# Patient Record
Sex: MALE | Race: ASIAN | Employment: FULL TIME | URBAN - METROPOLITAN AREA
[De-identification: names, ages, dates, MRNs, and addresses within clinical notes are randomized per-mention and may not be internally consistent; named-entity substitution may affect disease eponyms.]

---

## 2017-01-05 ENCOUNTER — OFFICE VISIT (OUTPATIENT)
Dept: FAMILY MEDICINE CLINIC | Facility: CLINIC | Age: 42
End: 2017-01-05

## 2017-01-05 VITALS
BODY MASS INDEX: 24.36 KG/M2 | SYSTOLIC BLOOD PRESSURE: 98 MMHG | TEMPERATURE: 98 F | HEART RATE: 82 BPM | HEIGHT: 71 IN | DIASTOLIC BLOOD PRESSURE: 72 MMHG | OXYGEN SATURATION: 99 % | WEIGHT: 174 LBS

## 2017-01-05 DIAGNOSIS — R63.5 WEIGHT GAIN: ICD-10-CM

## 2017-01-05 DIAGNOSIS — IMO0001 TYPE II OR UNSPECIFIED TYPE DIABETES MELLITUS WITHOUT MENTION OF COMPLICATION, UNCONTROLLED: Primary | ICD-10-CM

## 2017-01-05 LAB
ALBUMIN SERPL-MCNC: 4.3 G/DL (ref 3.5–4.8)
ALP LIVER SERPL-CCNC: 97 U/L (ref 45–117)
ALT SERPL-CCNC: 29 U/L (ref 17–63)
AST SERPL-CCNC: 15 U/L (ref 15–41)
BILIRUB SERPL-MCNC: 0.6 MG/DL (ref 0.1–2)
BILIRUB UR QL STRIP.AUTO: NEGATIVE
BUN BLD-MCNC: 14 MG/DL (ref 8–20)
CALCIUM BLD-MCNC: 9.5 MG/DL (ref 8.3–10.3)
CHLORIDE: 99 MMOL/L (ref 101–111)
CHOLEST SMN-MCNC: 200 MG/DL (ref ?–200)
CLARITY UR REFRACT.AUTO: CLEAR
CO2: 26 MMOL/L (ref 22–32)
COLOR UR AUTO: YELLOW
CREAT BLD-MCNC: 0.97 MG/DL (ref 0.7–1.3)
CREAT UR-SCNC: 60.6 MG/DL
ERYTHROCYTE [DISTWIDTH] IN BLOOD BY AUTOMATED COUNT: 12.8 % (ref 11.5–16)
EST. AVERAGE GLUCOSE BLD GHB EST-MCNC: 232 MG/DL (ref 68–126)
FREE T4: 1.1 NG/DL (ref 0.9–1.8)
GLUCOSE BLD-MCNC: 293 MG/DL (ref 70–99)
GLUCOSE BLOOD: 306
GLUCOSE UR STRIP.AUTO-MCNC: >=500 MG/DL
HBA1C MFR BLD HPLC: 9.7 % (ref ?–5.7)
HCT VFR BLD AUTO: 46.4 % (ref 37–53)
HDLC SERPL-MCNC: 35 MG/DL (ref 45–?)
HDLC SERPL: 5.71 {RATIO} (ref ?–4.97)
HGB BLD-MCNC: 15.3 G/DL (ref 13–17)
LDLC SERPL CALC-MCNC: 98 MG/DL (ref ?–130)
LEUKOCYTE ESTERASE UR QL STRIP.AUTO: NEGATIVE
M PROTEIN MFR SERPL ELPH: 7.9 G/DL (ref 6.1–8.3)
MCH RBC QN AUTO: 27.7 PG (ref 27–33.2)
MCHC RBC AUTO-ENTMCNC: 33 G/DL (ref 31–37)
MCV RBC AUTO: 83.9 FL (ref 80–99)
MICROALBUMIN UR-MCNC: 6.96 MG/DL
MICROALBUMIN/CREAT 24H UR-RTO: 114.9 UG/MG (ref ?–30)
NITRITE UR QL STRIP.AUTO: NEGATIVE
NONHDLC SERPL-MCNC: 165 MG/DL (ref ?–130)
PH UR STRIP.AUTO: 6 [PH] (ref 4.5–8)
PLATELET # BLD AUTO: 237 10(3)UL (ref 150–450)
POTASSIUM SERPL-SCNC: 4 MMOL/L (ref 3.6–5.1)
PROT UR STRIP.AUTO-MCNC: NEGATIVE MG/DL
RBC # BLD AUTO: 5.53 X10(6)UL (ref 4.3–5.7)
RED CELL DISTRIBUTION WIDTH-SD: 38.4 FL (ref 35.1–46.3)
SODIUM SERPL-SCNC: 136 MMOL/L (ref 136–144)
SP GR UR STRIP.AUTO: 1.03 (ref 1–1.03)
TEST STRIP LOT #: NORMAL NUMERIC
TRIGLYCERIDES: 335 MG/DL (ref ?–150)
TSI SER-ACNC: 2.59 MIU/ML (ref 0.35–5.5)
UROBILINOGEN UR STRIP.AUTO-MCNC: <2 MG/DL
VLDL: 67 MG/DL (ref 5–40)
WBC # BLD AUTO: 8.3 X10(3) UL (ref 4–13)

## 2017-01-05 PROCEDURE — 84439 ASSAY OF FREE THYROXINE: CPT | Performed by: FAMILY MEDICINE

## 2017-01-05 PROCEDURE — 82043 UR ALBUMIN QUANTITATIVE: CPT | Performed by: FAMILY MEDICINE

## 2017-01-05 PROCEDURE — 99214 OFFICE O/P EST MOD 30 MIN: CPT | Performed by: FAMILY MEDICINE

## 2017-01-05 PROCEDURE — 83036 HEMOGLOBIN GLYCOSYLATED A1C: CPT | Performed by: FAMILY MEDICINE

## 2017-01-05 PROCEDURE — 80053 COMPREHEN METABOLIC PANEL: CPT | Performed by: FAMILY MEDICINE

## 2017-01-05 PROCEDURE — 85027 COMPLETE CBC AUTOMATED: CPT | Performed by: FAMILY MEDICINE

## 2017-01-05 PROCEDURE — 82570 ASSAY OF URINE CREATININE: CPT | Performed by: FAMILY MEDICINE

## 2017-01-05 PROCEDURE — 82962 GLUCOSE BLOOD TEST: CPT | Performed by: FAMILY MEDICINE

## 2017-01-05 PROCEDURE — 84443 ASSAY THYROID STIM HORMONE: CPT | Performed by: FAMILY MEDICINE

## 2017-01-05 PROCEDURE — 80061 LIPID PANEL: CPT | Performed by: FAMILY MEDICINE

## 2017-01-05 PROCEDURE — 36415 COLL VENOUS BLD VENIPUNCTURE: CPT | Performed by: FAMILY MEDICINE

## 2017-01-05 PROCEDURE — 81001 URINALYSIS AUTO W/SCOPE: CPT | Performed by: FAMILY MEDICINE

## 2017-01-05 PROCEDURE — 81003 URINALYSIS AUTO W/O SCOPE: CPT | Performed by: FAMILY MEDICINE

## 2017-01-05 RX ORDER — LANCETS
1 EACH MISCELLANEOUS 2 TIMES DAILY
Qty: 1 BOX | Refills: 3 | Status: SHIPPED | OUTPATIENT
Start: 2017-01-05 | End: 2018-01-05

## 2017-01-05 RX ORDER — GLIMEPIRIDE 4 MG/1
4 TABLET ORAL 2 TIMES DAILY
Qty: 60 TABLET | Refills: 3 | Status: SHIPPED | OUTPATIENT
Start: 2017-01-05 | End: 2017-06-17

## 2017-01-05 RX ORDER — BLOOD-GLUCOSE METER
1 EACH MISCELLANEOUS 2 TIMES DAILY
Qty: 1 KIT | Refills: 0 | Status: SHIPPED | OUTPATIENT
Start: 2017-01-05 | End: 2018-01-05

## 2017-01-05 RX ORDER — LISINOPRIL 2.5 MG/1
2.5 TABLET ORAL DAILY
Qty: 30 TABLET | Refills: 3 | Status: SHIPPED | OUTPATIENT
Start: 2017-01-05 | End: 2017-07-21

## 2017-01-05 RX ORDER — GLIMEPIRIDE 2 MG/1
2 TABLET ORAL 2 TIMES DAILY
COMMUNITY
End: 2017-01-05

## 2017-01-05 RX ORDER — ATORVASTATIN CALCIUM 10 MG/1
10 TABLET, FILM COATED ORAL DAILY
COMMUNITY
End: 2017-12-14

## 2017-01-05 NOTE — PROGRESS NOTES
BP 98/72 mmHg  Pulse 82  Temp(Src) 98 °F (36.7 °C) (Oral)  Ht 71\"  Wt 174 lb  BMI 24.28 kg/m2  SpO2 99% Body mass index is 24.28 kg/(m^2).      Patient presents with:  Diabetes: room 3 refill on meds       HPI:   Yari Guardado is a 42 year ol 8.1(H)   Cholesterol Total <200 mg/dL - 148   Triglycerides <150 mg/dL - 171(H)   HDL >45 mg/dL - 38(L)   LDL <130 mg/dL - 76   Microalbumin Urine - - 12.80   Microalb/Creatinine Calc <=30.0 ug/mg - 32. 8(H)   LISINOPRIL 2.5 MG OR TABS - Take 1 tablet (2.5 without murmur, normal S1 and S2 without S3 or S4  GI: good BS's,no masses, HSM or tenderness  EXTREMITIES: no cyanosis, clubbing or edema  NEURO: A&O to person place and time. Normal bilateral monofilament exam. Normal bilateral vibration sensation.  Norm tablet (2.5 mg total) by mouth daily. aspirin 81 MG Oral Tab 30 tablet 0      Sig: Take 1 tablet (81 mg total) by mouth daily.       Blood Glucose Monitoring Suppl (ACCU-CHEK LUZ MARIA PLUS) W/DEVICE Does not apply Kit 1 kit 0      Si Device by Other r

## 2017-01-06 NOTE — PROGRESS NOTES
Quick Note:    Abnormal results, please advised the patient to follow-up next week to discuss the options to treat  ______

## 2017-01-09 ENCOUNTER — TELEPHONE (OUTPATIENT)
Dept: FAMILY MEDICINE CLINIC | Facility: CLINIC | Age: 42
End: 2017-01-09

## 2017-01-09 NOTE — TELEPHONE ENCOUNTER
Spoke with patient regarding need for follow up visit to discuss test results, he verbalized understanding and will follow up on 01/12/2017 to discuss with

## 2017-01-09 NOTE — TELEPHONE ENCOUNTER
----- Message from Edmund Cunha sent at 1/6/2017  3:30 PM CST -----  LVM to have patient call office back.

## 2017-01-12 ENCOUNTER — OFFICE VISIT (OUTPATIENT)
Dept: FAMILY MEDICINE CLINIC | Facility: CLINIC | Age: 42
End: 2017-01-12

## 2017-01-12 VITALS
BODY MASS INDEX: 24.36 KG/M2 | HEIGHT: 71 IN | HEART RATE: 88 BPM | WEIGHT: 174 LBS | TEMPERATURE: 98 F | SYSTOLIC BLOOD PRESSURE: 130 MMHG | DIASTOLIC BLOOD PRESSURE: 72 MMHG | OXYGEN SATURATION: 99 %

## 2017-01-12 DIAGNOSIS — IMO0001 TYPE II OR UNSPECIFIED TYPE DIABETES MELLITUS WITHOUT MENTION OF COMPLICATION, UNCONTROLLED: ICD-10-CM

## 2017-01-12 LAB
GLUCOSE BLOOD: 301
TEST STRIP LOT #: NORMAL NUMERIC

## 2017-01-12 PROCEDURE — 99214 OFFICE O/P EST MOD 30 MIN: CPT | Performed by: FAMILY MEDICINE

## 2017-01-12 PROCEDURE — 82962 GLUCOSE BLOOD TEST: CPT | Performed by: FAMILY MEDICINE

## 2017-01-12 RX ORDER — METFORMIN HYDROCHLORIDE 750 MG/1
750 TABLET, EXTENDED RELEASE ORAL
Qty: 30 TABLET | Refills: 3 | Status: SHIPPED | OUTPATIENT
Start: 2017-01-12 | End: 2017-02-18

## 2017-01-13 NOTE — PROGRESS NOTES
/72 mmHg  Pulse 88  Temp(Src) 97.9 °F (36.6 °C) (Oral)  Ht 71\"  Wt 174 lb  BMI 24.28 kg/m2  SpO2 99%              No chief complaint on file. HPI;    Min Granado is a 42year old male.   Patient comes in today for follow-up, after Finger stick route daily.  Disp: 1 Box Rfl: 2   Glucose Blood (ONETOUCH TEST) In Vitro Strip daily Disp: 50 each Rfl: 2      Past Medical History   Diagnosis Date   • Diabetes ([facility] [address]    • Hyperlipidemia       Social History:    Smoking Status: Never Smoker time was used in counseling regarding diet and exercise  The patient indicates understanding of these issues and agrees to the plan.   Imaging & Consults:  None  Meds & Refills for this Visit:  Signed Prescriptions Disp Refills    MetFORMIN HCl  MG Or

## 2017-02-18 ENCOUNTER — OFFICE VISIT (OUTPATIENT)
Dept: FAMILY MEDICINE CLINIC | Facility: CLINIC | Age: 42
End: 2017-02-18

## 2017-02-18 VITALS
DIASTOLIC BLOOD PRESSURE: 74 MMHG | RESPIRATION RATE: 18 BRPM | TEMPERATURE: 98 F | BODY MASS INDEX: 23.94 KG/M2 | HEIGHT: 71 IN | OXYGEN SATURATION: 100 % | WEIGHT: 171 LBS | SYSTOLIC BLOOD PRESSURE: 118 MMHG | HEART RATE: 99 BPM

## 2017-02-18 DIAGNOSIS — IMO0001 UNCONTROLLED TYPE 2 DIABETES MELLITUS WITHOUT COMPLICATION, WITHOUT LONG-TERM CURRENT USE OF INSULIN: ICD-10-CM

## 2017-02-18 LAB — GLUCOSE BLOOD: 220

## 2017-02-18 PROCEDURE — 82962 GLUCOSE BLOOD TEST: CPT | Performed by: FAMILY MEDICINE

## 2017-02-18 PROCEDURE — 99214 OFFICE O/P EST MOD 30 MIN: CPT | Performed by: FAMILY MEDICINE

## 2017-02-18 RX ORDER — METFORMIN HYDROCHLORIDE 750 MG/1
750 TABLET, EXTENDED RELEASE ORAL 2 TIMES DAILY WITH MEALS
Qty: 60 TABLET | Refills: 3 | Status: SHIPPED | OUTPATIENT
Start: 2017-02-18 | End: 2017-07-21

## 2017-02-19 NOTE — PROGRESS NOTES
/74 mmHg  Pulse 99  Temp(Src) 98.4 °F (36.9 °C) (Oral)  Resp 18  Ht 71\"  Wt 171 lb  BMI 23.86 kg/m2  SpO2 100%              Patient presents with:  Diabetes: room 2--needs referral for eye exam, glucose was 220 in office       HPI;    Tosha Argueta Take 1 tablet (750 mg total) by mouth daily with breakfast. Disp: 30 tablet Rfl: 3   glimepiride (AMARYL) 4 MG Oral Tab Take 1 tablet (4 mg total) by mouth 2 (two) times daily.  Disp: 60 tablet Rfl: 3   Blood Glucose Monitoring Suppl (ONETOUCH ULTRA 2) W/DE check  sugars at least twice a day  Referral given for the patient to see the ophthalmologist for diabetic eye exam  -       Accucheck  -     MetFORMIN HCl  MG Oral Tablet 24 Hr; Take 1 tablet (750 mg total) by mouth 2 (two) times daily with meals.

## 2017-04-06 ENCOUNTER — OFFICE VISIT (OUTPATIENT)
Dept: FAMILY MEDICINE CLINIC | Facility: CLINIC | Age: 42
End: 2017-04-06

## 2017-04-06 VITALS
BODY MASS INDEX: 24.5 KG/M2 | HEIGHT: 71 IN | SYSTOLIC BLOOD PRESSURE: 120 MMHG | OXYGEN SATURATION: 99 % | DIASTOLIC BLOOD PRESSURE: 80 MMHG | WEIGHT: 175 LBS | TEMPERATURE: 99 F | HEART RATE: 102 BPM

## 2017-04-06 DIAGNOSIS — IMO0001 UNCONTROLLED TYPE 2 DIABETES MELLITUS WITHOUT COMPLICATION, WITHOUT LONG-TERM CURRENT USE OF INSULIN: Primary | ICD-10-CM

## 2017-04-06 PROCEDURE — 99214 OFFICE O/P EST MOD 30 MIN: CPT | Performed by: FAMILY MEDICINE

## 2017-04-06 PROCEDURE — 36415 COLL VENOUS BLD VENIPUNCTURE: CPT | Performed by: FAMILY MEDICINE

## 2017-04-06 NOTE — PROGRESS NOTES
/80 mmHg  Pulse 102  Temp(Src) 98.9 °F (37.2 °C) (Oral)  Ht 71\"  Wt 175 lb  BMI 24.42 kg/m2  SpO2 99%              Patient presents with:  Diabetes: f/u       HPI;    Kari Mallory is a 42year old male.   Patient is here for follow-up on Suppl (ONETOUCH ULTRA 2) W/DEVICE Does not apply Kit 1 Device by Other route 2 (two) times daily. Use as directed. Disp: 1 kit Rfl: 0   ONETOUCH DELICA LANCETS FINE Does not apply Misc 1 lancet by Finger stick route daily.  Disp: 1 Box Rfl: 2   Glucose Bloo visit  - should continue to check  sugars at least twice a day  Referral given in February for seeing ophthalmology, patient did not see the ophthalmologist yet for the annual eye exam    Comp Metabolic Panel;  Future  -     Hemoglobin A1C; Future  Total ti

## 2017-04-07 NOTE — PROGRESS NOTES
Quick Note:    Please notify the patient of Improved hemoglobin A1  continue current management--No change in medications  Continue to diet and exercise  Recheck blood work in 3 months    ______

## 2017-04-08 ENCOUNTER — TELEPHONE (OUTPATIENT)
Dept: FAMILY MEDICINE CLINIC | Facility: CLINIC | Age: 42
End: 2017-04-08

## 2017-04-09 ENCOUNTER — TELEPHONE (OUTPATIENT)
Dept: FAMILY MEDICINE CLINIC | Facility: CLINIC | Age: 42
End: 2017-04-09

## 2017-04-10 NOTE — TELEPHONE ENCOUNTER
Patient informed that he needs to schedule opthalmology appointment, he verbalized understanding and stated he will.

## 2017-04-26 RX ORDER — ATORVASTATIN CALCIUM 10 MG/1
TABLET, FILM COATED ORAL
Qty: 30 TABLET | Refills: 1 | OUTPATIENT
Start: 2017-04-26

## 2017-06-17 DIAGNOSIS — IMO0001 UNCONTROLLED TYPE 2 DIABETES MELLITUS WITHOUT COMPLICATION, WITHOUT LONG-TERM CURRENT USE OF INSULIN: Primary | ICD-10-CM

## 2017-06-19 RX ORDER — GLIMEPIRIDE 4 MG/1
TABLET ORAL
Qty: 60 TABLET | Refills: 2 | Status: SHIPPED | OUTPATIENT
Start: 2017-06-19 | End: 2017-12-14

## 2017-06-19 NOTE — TELEPHONE ENCOUNTER
Pending Prescriptions Disp Refills    GLIMEPIRIDE 4 MG Oral Tab [Pharmacy Med Name: Glimepiride Oral Tablet 4 MG] 60 tablet 2     Sig: TAKE 1 TABLET BY MOUTH TWO TIMES A DAY        Lov4/6/2017, No future appointments.   Diabetes:    Lab Results  Component

## 2017-07-21 DIAGNOSIS — IMO0001 UNCONTROLLED TYPE 2 DIABETES MELLITUS WITHOUT COMPLICATION, WITHOUT LONG-TERM CURRENT USE OF INSULIN: ICD-10-CM

## 2017-07-21 DIAGNOSIS — I10 ESSENTIAL HYPERTENSION: Primary | ICD-10-CM

## 2017-07-21 RX ORDER — LISINOPRIL 2.5 MG/1
TABLET ORAL
Qty: 30 TABLET | Refills: 2 | Status: SHIPPED | OUTPATIENT
Start: 2017-07-21 | End: 2017-12-14

## 2017-07-21 RX ORDER — METFORMIN HYDROCHLORIDE 750 MG/1
TABLET, EXTENDED RELEASE ORAL
Qty: 60 TABLET | Refills: 2 | Status: SHIPPED | OUTPATIENT
Start: 2017-07-21 | End: 2017-11-10

## 2017-07-21 NOTE — TELEPHONE ENCOUNTER
Refill as per protocol. LOV 4/6/2017    No future appointments.        Signed Prescriptions Disp Refills    LISINOPRIL 2.5 MG Oral Tab 30 tablet 2      Sig: TAKE 1 TABLET BY MOUTH ONE TIME A DAY         Authorizing Provider: Rosa Barfield

## 2017-10-28 DIAGNOSIS — IMO0001 UNCONTROLLED TYPE 2 DIABETES MELLITUS WITHOUT COMPLICATION, WITHOUT LONG-TERM CURRENT USE OF INSULIN: ICD-10-CM

## 2017-10-30 RX ORDER — METFORMIN HYDROCHLORIDE 750 MG/1
TABLET, EXTENDED RELEASE ORAL
Qty: 60 TABLET | Refills: 1 | OUTPATIENT
Start: 2017-10-30

## 2017-10-30 NOTE — TELEPHONE ENCOUNTER
Refused Prescriptions Disp Refills    METFORMIN HCL  MG Oral Tablet 24 Hr [Pharmacy Med Name: MetFORMIN HCl ER Oral Tablet Extended Release 24 Hour 750 MG] 60 tablet 1      Sig: TAKE 1 TABLET BY MOUTH TWO TIMES A DAY WITH MEALS        Refused By: Adams Yanes

## 2017-11-10 DIAGNOSIS — IMO0001 UNCONTROLLED TYPE 2 DIABETES MELLITUS WITHOUT COMPLICATION, WITHOUT LONG-TERM CURRENT USE OF INSULIN: ICD-10-CM

## 2017-11-10 RX ORDER — METFORMIN HYDROCHLORIDE 750 MG/1
TABLET, EXTENDED RELEASE ORAL
Qty: 60 TABLET | Refills: 0 | Status: SHIPPED | OUTPATIENT
Start: 2017-11-10 | End: 2017-12-14

## 2017-11-10 NOTE — TELEPHONE ENCOUNTER
Signed Prescriptions Disp Refills    METFORMIN HCL  MG Oral Tablet 24 Hr 60 tablet 0      Sig: TAKE 1 TABLET BY MOUTH TWO TIMES A DAY WITH MEALS        Authorizing Provider: Leisa Bullard        Ordering User: Darin isbell 04/06/2017,

## 2017-12-06 DIAGNOSIS — IMO0001 UNCONTROLLED TYPE 2 DIABETES MELLITUS WITHOUT COMPLICATION, WITHOUT LONG-TERM CURRENT USE OF INSULIN: ICD-10-CM

## 2017-12-06 RX ORDER — METFORMIN HYDROCHLORIDE 750 MG/1
TABLET, EXTENDED RELEASE ORAL
Qty: 60 TABLET | Refills: 0 | OUTPATIENT
Start: 2017-12-06

## 2017-12-06 NOTE — TELEPHONE ENCOUNTER
Pending Prescriptions Disp Refills    METFORMIN HCL  MG Oral Tablet 24 Hr [Pharmacy Med Name: MetFORMIN HCl ER Oral Tablet Extended Release 24 Hour 750 MG] 60 tablet 0     Sig: TAKE 1 TABLET BY MOUTH TWO TIMES A DAY WITH MEALS       refill denied a

## 2017-12-14 ENCOUNTER — LAB ENCOUNTER (OUTPATIENT)
Dept: LAB | Age: 42
End: 2017-12-14
Attending: FAMILY MEDICINE
Payer: COMMERCIAL

## 2017-12-14 ENCOUNTER — OFFICE VISIT (OUTPATIENT)
Dept: FAMILY MEDICINE CLINIC | Facility: CLINIC | Age: 42
End: 2017-12-14

## 2017-12-14 VITALS
DIASTOLIC BLOOD PRESSURE: 78 MMHG | HEART RATE: 68 BPM | RESPIRATION RATE: 18 BRPM | OXYGEN SATURATION: 98 % | SYSTOLIC BLOOD PRESSURE: 124 MMHG | HEIGHT: 71 IN | WEIGHT: 175.38 LBS | BODY MASS INDEX: 24.55 KG/M2 | TEMPERATURE: 98 F

## 2017-12-14 DIAGNOSIS — Z00.00 ANNUAL PHYSICAL EXAM: Primary | ICD-10-CM

## 2017-12-14 DIAGNOSIS — IMO0001 UNCONTROLLED TYPE 2 DIABETES MELLITUS WITHOUT COMPLICATION, WITHOUT LONG-TERM CURRENT USE OF INSULIN: ICD-10-CM

## 2017-12-14 DIAGNOSIS — Z23 NEED FOR VACCINATION: ICD-10-CM

## 2017-12-14 DIAGNOSIS — I10 ESSENTIAL HYPERTENSION: ICD-10-CM

## 2017-12-14 PROCEDURE — 80061 LIPID PANEL: CPT

## 2017-12-14 PROCEDURE — 99396 PREV VISIT EST AGE 40-64: CPT | Performed by: FAMILY MEDICINE

## 2017-12-14 PROCEDURE — 36415 COLL VENOUS BLD VENIPUNCTURE: CPT

## 2017-12-14 PROCEDURE — 83036 HEMOGLOBIN GLYCOSYLATED A1C: CPT

## 2017-12-14 PROCEDURE — 90686 IIV4 VACC NO PRSV 0.5 ML IM: CPT | Performed by: FAMILY MEDICINE

## 2017-12-14 PROCEDURE — 99214 OFFICE O/P EST MOD 30 MIN: CPT | Performed by: FAMILY MEDICINE

## 2017-12-14 PROCEDURE — 90471 IMMUNIZATION ADMIN: CPT | Performed by: FAMILY MEDICINE

## 2017-12-14 RX ORDER — ATORVASTATIN CALCIUM 10 MG/1
10 TABLET, FILM COATED ORAL DAILY
Qty: 30 TABLET | Refills: 3 | Status: SHIPPED | OUTPATIENT
Start: 2017-12-14 | End: 2018-04-09

## 2017-12-14 RX ORDER — LISINOPRIL 2.5 MG/1
TABLET ORAL
Qty: 30 TABLET | Refills: 3 | Status: SHIPPED | OUTPATIENT
Start: 2017-12-14

## 2017-12-14 RX ORDER — GLIMEPIRIDE 4 MG/1
TABLET ORAL
Qty: 60 TABLET | Refills: 3 | Status: SHIPPED | OUTPATIENT
Start: 2017-12-14 | End: 2018-06-13

## 2017-12-14 RX ORDER — METFORMIN HYDROCHLORIDE 750 MG/1
TABLET, EXTENDED RELEASE ORAL
Qty: 60 TABLET | Refills: 3 | Status: SHIPPED | OUTPATIENT
Start: 2017-12-14 | End: 2018-05-09

## 2017-12-14 NOTE — PROGRESS NOTES
Worsening of hemoglobin A1c  8.4  Improved lipid profile  Patient to follow-up to discuss the options of insulin

## 2017-12-14 NOTE — PROGRESS NOTES
/78   Pulse 68   Temp 97.6 °F (36.4 °C) (Temporal)   Resp 18   Ht 71\"   Wt 175 lb 6.4 oz   SpO2 98%   BMI 24.46 kg/m²  Body mass index is 24.46 kg/m². Patient presents with:   Annual  Diabetes: follow up- foot exam - refill on medication      N Blood Glucose Monitoring Suppl (ACCU-CHEK LUZ MARIA PLUS) W/DEVICE Does not apply Kit 1 Device by Other route 2 (two) times daily.  Disp: 1 kit Rfl: 0   Glucose Blood (ACCU-CHEK LUZ MARIA PLUS) In Vitro Strip Check sugars twice a day Disp: 100 strip Rfl: 1   ACCU depression or anxiety  HEMATOLOGY: denies h/o anemia; denies bruising or excessive bleeding  ENDOCRINE: denies excessive thirst or urination; denies unexpected wt gain or wt loss  ALLERGY/IMM.: denies food or seasonal allergies    EXAM:   /78   Pulse blood sugar regulation. Discussed effort for compliance with program, including any medication and Exercise   The need for regular foot care and ophthalmologic evaluation was discussed. Follow fasting blood glucose levels.  Advice to bring the log of afua

## 2018-04-09 RX ORDER — ATORVASTATIN CALCIUM 10 MG/1
TABLET, FILM COATED ORAL
Qty: 30 TABLET | Refills: 2 | Status: SHIPPED | OUTPATIENT
Start: 2018-04-09

## 2018-04-09 NOTE — TELEPHONE ENCOUNTER
Cholesterol Medication Protocol Failed4/9 3:31 AM   ALT < 80    ALT resulted within past year     Approve/deny refill.  Last filled 12/14/17, last ov 12/14/17, last labwork 12/14/17,  Pt has appt scheduled 4/14/18

## 2018-04-13 ENCOUNTER — TELEPHONE (OUTPATIENT)
Dept: FAMILY MEDICINE CLINIC | Facility: CLINIC | Age: 43
End: 2018-04-13

## 2018-04-13 DIAGNOSIS — E78.00 PURE HYPERCHOLESTEROLEMIA: ICD-10-CM

## 2018-04-13 DIAGNOSIS — IMO0001 UNCONTROLLED TYPE 2 DIABETES MELLITUS WITHOUT COMPLICATION, WITHOUT LONG-TERM CURRENT USE OF INSULIN: Primary | ICD-10-CM

## 2018-04-13 NOTE — TELEPHONE ENCOUNTER
Please see below, pt last visit and labs done on 12/14/17 and was to follow up in 3 months. Do you want more labs done prior to his visit in May?

## 2018-05-09 DIAGNOSIS — IMO0001 UNCONTROLLED TYPE 2 DIABETES MELLITUS WITHOUT COMPLICATION, WITHOUT LONG-TERM CURRENT USE OF INSULIN: ICD-10-CM

## 2018-05-10 RX ORDER — METFORMIN HYDROCHLORIDE 750 MG/1
TABLET, EXTENDED RELEASE ORAL
Qty: 60 TABLET | Refills: 0 | Status: SHIPPED | OUTPATIENT
Start: 2018-05-10

## 2018-05-10 NOTE — TELEPHONE ENCOUNTER
12/14/2017  Future Appointments  Date Time Provider Consuelo Lopez   5/24/2018 11:00 AM Maida Rojas MD [facility]       Telephone Information:   Home Phone [phone]   Mobile [phone]     Diabetes:    Lab Results  Component Value Date   A1C 8.4 (H) 12/14/2017   A1C 7.2 (H) 04/06/2017   A1C 9.7 (H) 01/05/2017    (H) 12/14/2017    (H) 04/06/2017    (H) 01/05/2017     Lab Results  Component Value Date   CHOLEST 172 12/14/2017   CHOLEST 200 (H) 01/05/2017   CHOLEST 148 05/06/2016     Lab Results  Component Value Date   HDL 35 (L) 12/14/2017   HDL 35 (L) 01/05/2017   HDL 38 (L) 05/06/2016     Lab Results  Component Value Date    12/14/2017   LDL 98 01/05/2017   LDL 76 05/06/2016     Lab Results  Component Value Date   TRIG 174 (H) 12/14/2017   TRIG 335 (H) 01/05/2017   TRIG 171 (H) 05/06/2016     Lab Results  Component Value Date   AST 21 04/06/2017   AST 15 01/05/2017   AST 13 (L) 05/06/2016     Lab Results  Component Value Date   ALT 51 04/06/2017   ALT 29 01/05/2017   ALT 25 05/06/2016

## 2018-05-13 DIAGNOSIS — IMO0001 UNCONTROLLED TYPE 2 DIABETES MELLITUS WITHOUT COMPLICATION, WITHOUT LONG-TERM CURRENT USE OF INSULIN: ICD-10-CM

## 2018-05-14 RX ORDER — METFORMIN HYDROCHLORIDE 750 MG/1
TABLET, EXTENDED RELEASE ORAL
Qty: 60 TABLET | Refills: 0 | Status: SHIPPED | OUTPATIENT
Start: 2018-05-14 | End: 2018-06-17

## 2018-05-14 NOTE — TELEPHONE ENCOUNTER
Refill for Metformin approved per protocol. LOV 12/14/17, and labs 12/14/17. Next appt in office 05/24/2018.

## 2018-06-13 DIAGNOSIS — IMO0001 UNCONTROLLED TYPE 2 DIABETES MELLITUS WITHOUT COMPLICATION, WITHOUT LONG-TERM CURRENT USE OF INSULIN: ICD-10-CM

## 2018-06-14 RX ORDER — GLIMEPIRIDE 4 MG/1
TABLET ORAL
Qty: 30 TABLET | Refills: 0 | Status: SHIPPED | OUTPATIENT
Start: 2018-06-14

## 2018-06-14 NOTE — TELEPHONE ENCOUNTER
LOV    LAST LAB 12/17  Notes Recorded by Hanane Fernandes RN on 12/29/2017 at 12:10 PM CST  Patient stated he spoke with Dr and regarding these results and will follow up in three months    LAST RX   glimepiride 4 MG Oral Tab 60 tablet 3 12/14/2017

## 2018-06-14 NOTE — TELEPHONE ENCOUNTER
Left detailed message with pt regarding 4 cancelled appts and most recent NO show    Pt's labs were due 4/14/18 and pt has also not completed these    Asked pt to please have labs done ASAP then call for appt    Refilled #30 only

## 2018-06-17 DIAGNOSIS — IMO0001 UNCONTROLLED TYPE 2 DIABETES MELLITUS WITHOUT COMPLICATION, WITHOUT LONG-TERM CURRENT USE OF INSULIN: ICD-10-CM

## 2018-06-18 RX ORDER — GLIMEPIRIDE 4 MG/1
TABLET ORAL
Qty: 60 TABLET | Refills: 2 | OUTPATIENT
Start: 2018-06-18

## 2018-06-18 RX ORDER — METFORMIN HYDROCHLORIDE 750 MG/1
TABLET, EXTENDED RELEASE ORAL
Qty: 30 TABLET | Refills: 0 | Status: SHIPPED | OUTPATIENT
Start: 2018-06-18

## 2018-06-18 NOTE — TELEPHONE ENCOUNTER
No future appointments.           10:51 AM   Note      Left detailed message with pt regarding 4 cancelled appts and most recent NO show     Pt's labs were due 4/14/18 and pt has also not completed these     Asked pt to please have labs done ASAP then call

## 2018-07-19 DIAGNOSIS — IMO0001 UNCONTROLLED TYPE 2 DIABETES MELLITUS WITHOUT COMPLICATION, WITHOUT LONG-TERM CURRENT USE OF INSULIN: ICD-10-CM

## 2018-07-20 RX ORDER — GLIMEPIRIDE 4 MG/1
TABLET ORAL
Qty: 30 TABLET | Refills: 0 | OUTPATIENT
Start: 2018-07-20

## 2018-07-20 NOTE — TELEPHONE ENCOUNTER
LOV    LAST LAB    LAST RX    Next OV    PROTOCOL    Diabetic Medication Protocol Failed7/19 3:46 PM   HgBA1C procedure resulted in past 6 months    Last HgBA1C < 7.5    Microalbumin procedure in past 12 months or taking ACE/ARB    Appointment in past 6 or

## 2018-12-11 ENCOUNTER — PATIENT OUTREACH (OUTPATIENT)
Dept: FAMILY MEDICINE CLINIC | Facility: CLINIC | Age: 43
End: 2018-12-11

## 2018-12-11 NOTE — PROGRESS NOTES
Left message for patient to schedule an appointment with Dr. Elis Saez.     Vita Howard, 12/11/18, 4:38 PM

## 2019-02-15 ENCOUNTER — APPOINTMENT (OUTPATIENT)
Dept: CV DIAGNOSTICS | Age: 44
End: 2019-02-15
Attending: EMERGENCY MEDICINE
Payer: COMMERCIAL

## 2019-02-15 ENCOUNTER — TELEPHONE (OUTPATIENT)
Dept: FAMILY MEDICINE CLINIC | Facility: CLINIC | Age: 44
End: 2019-02-15

## 2019-02-15 ENCOUNTER — HOSPITAL ENCOUNTER (EMERGENCY)
Age: 44
Discharge: HOME | End: 2019-02-15
Attending: EMERGENCY MEDICINE
Payer: COMMERCIAL

## 2019-02-15 ENCOUNTER — APPOINTMENT (OUTPATIENT)
Dept: GENERAL RADIOLOGY | Age: 44
End: 2019-02-15
Attending: EMERGENCY MEDICINE
Payer: COMMERCIAL

## 2019-02-15 VITALS
TEMPERATURE: 98 F | BODY MASS INDEX: 23.52 KG/M2 | OXYGEN SATURATION: 98 % | DIASTOLIC BLOOD PRESSURE: 72 MMHG | HEIGHT: 71 IN | WEIGHT: 168 LBS | RESPIRATION RATE: 18 BRPM | HEART RATE: 74 BPM | SYSTOLIC BLOOD PRESSURE: 102 MMHG

## 2019-02-15 DIAGNOSIS — R00.2 PALPITATIONS: Primary | ICD-10-CM

## 2019-02-15 LAB
ALBUMIN SERPL-MCNC: 3.9 G/DL (ref 3.4–5)
ALBUMIN/GLOB SERPL: 1 {RATIO} (ref 1–2)
ALP LIVER SERPL-CCNC: 81 U/L (ref 45–117)
ALT SERPL-CCNC: 28 U/L (ref 16–61)
ANION GAP SERPL CALC-SCNC: 8 MMOL/L (ref 0–18)
AST SERPL-CCNC: 11 U/L (ref 15–37)
ATRIAL RATE: 79 BPM
BASOPHILS # BLD AUTO: 0.03 X10(3) UL (ref 0–0.2)
BASOPHILS NFR BLD AUTO: 0.4 %
BILIRUB SERPL-MCNC: 1 MG/DL (ref 0.1–2)
BUN BLD-MCNC: 12 MG/DL (ref 7–18)
BUN/CREAT SERPL: 12.2 (ref 10–20)
CALCIUM BLD-MCNC: 8.7 MG/DL (ref 8.5–10.1)
CHLORIDE SERPL-SCNC: 100 MMOL/L (ref 98–107)
CO2 SERPL-SCNC: 26 MMOL/L (ref 21–32)
CREAT BLD-MCNC: 0.98 MG/DL (ref 0.7–1.3)
D-DIMER: <0.27 UG/ML FEU (ref 0–0.49)
DEPRECATED RDW RBC AUTO: 37.4 FL (ref 35.1–46.3)
EOSINOPHIL # BLD AUTO: 0.2 X10(3) UL (ref 0–0.7)
EOSINOPHIL NFR BLD AUTO: 2.4 %
ERYTHROCYTE [DISTWIDTH] IN BLOOD BY AUTOMATED COUNT: 12.7 % (ref 11–15)
GLOBULIN PLAS-MCNC: 4.1 G/DL (ref 2.8–4.4)
GLUCOSE BLD-MCNC: 271 MG/DL (ref 70–99)
HCT VFR BLD AUTO: 49.2 % (ref 39–53)
HGB BLD-MCNC: 16 G/DL (ref 13–17.5)
IMM GRANULOCYTES # BLD AUTO: 0.02 X10(3) UL (ref 0–1)
IMM GRANULOCYTES NFR BLD: 0.2 %
LIPASE SERPL-CCNC: 220 U/L (ref 73–393)
LYMPHOCYTES # BLD AUTO: 2.02 X10(3) UL (ref 1–4)
LYMPHOCYTES NFR BLD AUTO: 24.7 %
M PROTEIN MFR SERPL ELPH: 8 G/DL (ref 6.4–8.2)
MCH RBC QN AUTO: 26.5 PG (ref 26–34)
MCHC RBC AUTO-ENTMCNC: 32.5 G/DL (ref 31–37)
MCV RBC AUTO: 81.5 FL (ref 80–100)
MONOCYTES # BLD AUTO: 0.62 X10(3) UL (ref 0.1–1)
MONOCYTES NFR BLD AUTO: 7.6 %
NEUTROPHILS # BLD AUTO: 5.29 X10 (3) UL (ref 1.5–7.7)
NEUTROPHILS # BLD AUTO: 5.29 X10(3) UL (ref 1.5–7.7)
NEUTROPHILS NFR BLD AUTO: 64.7 %
OSMOLALITY SERPL CALC.SUM OF ELEC: 287 MOSM/KG (ref 275–295)
P AXIS: 66 DEGREES
P-R INTERVAL: 180 MS
PLATELET # BLD AUTO: 283 10(3)UL (ref 150–450)
POTASSIUM SERPL-SCNC: 3.9 MMOL/L (ref 3.5–5.1)
Q-T INTERVAL: 370 MS
QRS DURATION: 84 MS
QTC CALCULATION (BEZET): 424 MS
R AXIS: 35 DEGREES
RBC # BLD AUTO: 6.04 X10(6)UL (ref 4.3–5.7)
SODIUM SERPL-SCNC: 134 MMOL/L (ref 136–145)
T AXIS: 46 DEGREES
TROPONIN I SERPL-MCNC: <0.045 NG/ML (ref ?–0.04)
VENTRICULAR RATE: 79 BPM
WBC # BLD AUTO: 8.2 X10(3) UL (ref 4–11)

## 2019-02-15 PROCEDURE — 85378 FIBRIN DEGRADE SEMIQUANT: CPT | Performed by: EMERGENCY MEDICINE

## 2019-02-15 PROCEDURE — 96361 HYDRATE IV INFUSION ADD-ON: CPT

## 2019-02-15 PROCEDURE — 93005 ELECTROCARDIOGRAM TRACING: CPT

## 2019-02-15 PROCEDURE — 96360 HYDRATION IV INFUSION INIT: CPT

## 2019-02-15 PROCEDURE — 93018 CV STRESS TEST I&R ONLY: CPT | Performed by: EMERGENCY MEDICINE

## 2019-02-15 PROCEDURE — 85025 COMPLETE CBC W/AUTO DIFF WBC: CPT | Performed by: EMERGENCY MEDICINE

## 2019-02-15 PROCEDURE — 83690 ASSAY OF LIPASE: CPT | Performed by: EMERGENCY MEDICINE

## 2019-02-15 PROCEDURE — 99285 EMERGENCY DEPT VISIT HI MDM: CPT

## 2019-02-15 PROCEDURE — 71045 X-RAY EXAM CHEST 1 VIEW: CPT | Performed by: EMERGENCY MEDICINE

## 2019-02-15 PROCEDURE — 93350 STRESS TTE ONLY: CPT | Performed by: EMERGENCY MEDICINE

## 2019-02-15 PROCEDURE — 84484 ASSAY OF TROPONIN QUANT: CPT | Performed by: EMERGENCY MEDICINE

## 2019-02-15 PROCEDURE — 93010 ELECTROCARDIOGRAM REPORT: CPT

## 2019-02-15 PROCEDURE — 93017 CV STRESS TEST TRACING ONLY: CPT | Performed by: EMERGENCY MEDICINE

## 2019-02-15 PROCEDURE — 80053 COMPREHEN METABOLIC PANEL: CPT | Performed by: EMERGENCY MEDICINE

## 2019-02-15 RX ORDER — ASPIRIN 81 MG/1
324 TABLET, CHEWABLE ORAL ONCE
Status: COMPLETED | OUTPATIENT
Start: 2019-02-15 | End: 2019-02-15

## 2019-02-15 RX ORDER — SODIUM CHLORIDE 9 MG/ML
INJECTION, SOLUTION INTRAVENOUS CONTINUOUS
Status: DISCONTINUED | OUTPATIENT
Start: 2019-02-15 | End: 2019-02-15

## 2019-02-15 NOTE — TELEPHONE ENCOUNTER
Patient called in requesting an appointment with Dr. Kala Ribeiro today.  has experienced some chest discomfort since yesterday. Denies any SOB. States also feels like his heart is beating fast.  Speech clear and calm does not sound anxious.   Advised

## 2019-02-15 NOTE — ED PROVIDER NOTES
Stress echo results as reviewed with Dr. Luan Warner are negative patient was discharged as per disposition

## 2019-02-15 NOTE — ED INITIAL ASSESSMENT (HPI)
Pt c/o sharp chest pain left sided x 4 days occurs only at night time. Pt denies cp at this time. Pain radiates to left upper back,scapula. Pt denies dizziness or nausea.

## 2019-02-15 NOTE — ED NOTES
Cellulitis in Children   WHAT YOU NEED TO KNOW:   Cellulitis is a bacterial infection that affects the skin and tissues beneath the skin  The infection can happen in any part of your child's body  The most common areas are the arms, legs, and face  Your child's healthcare provider may draw a [place] around the edges of his or her cellulitis  If your child's cellulitis spreads, his or her healthcare provider will see it outside of the [place]  DISCHARGE INSTRUCTIONS:   Call 911 if:   · Your child has sudden trouble breathing or chest pain  Return to the emergency department if:   · The infected area gets larger and more painful  · Your child has a thin, gray-brown discharge coming from the infected skin area  · Your child has purple dots or bumps on his or her skin, or you see bleeding under the skin  · Your child has new swelling and pain in his or her legs  · The red, warm, swollen area gets larger  · You see red streaks coming from the infected area  Contact your child's healthcare provider if:   · Your child has a fever  · Your child's fever or pain does not go away or gets worse  · The area does not get smaller after 2 days of antibiotics  · Your child's skin is flaking or peeling off  · You have questions or concerns about your child's condition or care  Medicines:   · Medicines  help treat the bacterial infection or decrease pain  · Ibuprofen or acetaminophen:  These medicines are given to decrease your child's pain and fever  They can be bought without a doctor's order  Ask how much medicine is safe to give your child, and how often to give it  · Do not give aspirin to children under 25years of age  Your child could develop Reye syndrome if he takes aspirin  Reye syndrome can cause life-threatening brain and liver damage  Check your child's medicine labels for aspirin, salicylates, or oil of wintergreen  · Give your child's medicine as directed    Contact your Pt resting comfortably in bed. Pt denies cp at this time. child's healthcare provider if you think the medicine is not working as expected  Tell him or her if your child is allergic to any medicine  Keep a current list of the medicines, vitamins, and herbs your child takes  Include the amounts, and when, how, and why they are taken  Bring the list or the medicines in their containers to follow-up visits  Carry your child's medicine list with you in case of an emergency  Manage your child's symptoms:   · Elevate the area above the level of your child's heart  as often as you can  This will help decrease swelling and pain  Prop the area on pillows or blankets to keep it elevated comfortably  · Clean the area daily until the wound scabs over  Gently wash the area with soap and water  Pat dry  Use dressings as directed  · Place cool or warm, wet cloths on the area as directed  Use clean cloths and clean water  Leave it on the area until the cloth is room temperature  Pat the area dry with a clean, dry cloth  The cloths may help decrease pain  Prevent cellulitis:   · Remind your child to not scratch bug bites or areas of injury  Your child increases his or her risk for cellulitis by scratching these areas  · Protect your child's skin  Have your child wear equipment made for a sport he or she is playing  For example, have him or her wear knee and elbow pads when skating, and a bicycle helmet when riding a bike  Make sure your child wears shirts and pants that will protect his or her skin, and sturdy shoes  · Wash any scrapes or wounds with soap and water  Put on antibiotic cream or ointment, and cover it with a bandage  Check for signs of infection, such as pus or swelling, each time you change the bandage  · Do not let your child share personal items, such as towels, clothing, and razors  · Have your child wash his or her hands often  Make sure he or she washes with soap and water after using the bathroom or sneezing   He or she also needs to wash his or her hands before eating  Use lotion to prevent dry, cracked skin  · Treat athlete's foot or any other skin condition  This can help prevent a bacterial skin infection by lessening the itching and breaks in the skin  Follow up with your child's healthcare provider within 3 days or as directed:  Write down your questions so you remember to ask them during your child's visits  © 2017 [facility] Information is for End User's use only and may not be sold, redistributed or otherwise used for commercial purposes  All illustrations and images included in CareNotes® are the copyrighted property of [employer]  or [employer]  The above information is an  only  It is not intended as medical advice for individual conditions or treatments  Talk to your doctor, nurse or pharmacist before following any medical regimen to see if it is safe and effective for you

## 2019-02-15 NOTE — ED NOTES
awaiiting to go to stress test. Pt updated with plan of care. Pt denies chest pain at this time. Pt ambulatory to bathroom with steady gait.

## 2019-02-15 NOTE — ED PROVIDER NOTES
Patient Seen in: THE [facility] Emergency Department In [place]    History   Patient presents with:  Chest Pain Angina (cardiovascular)    Stated Complaint: chest pain at night only     HPI    I reviewed a telephone encounter.   Patient had called his doctor's 16   Ht 180.3 cm (5' 11\")   Wt 76.2 kg   SpO2 99%   BMI 23.43 kg/m²         Physical Exam  General: The patient is awake, alert, conversant. Patient answers questions quickly and appropriately.   He appears anxious  Eyes: sclera white, conjunctiva pink an individual orders. EKG    Rate, intervals and axes as noted on EKG Report.   Rate: 79 bpm  Rhythm: Sinus Rhythm  Reading: Sinus rhythm with no extraordinary ST change to suggest ischemia or infarct                    MDM   Patient symptoms are atypical

## 2019-02-15 NOTE — TELEPHONE ENCOUNTER
Pt called asked to schedule malachi with Dr Marlo Almonte today. Informed him Dr not in today. Asked what does he need to be seen for. He responded chest pain. I told him I was getting Triage.  Tried to call 1st Triage - no answer, tried to get other triage nurse & disco

## 2019-07-09 ENCOUNTER — PATIENT OUTREACH (OUTPATIENT)
Dept: FAMILY MEDICINE CLINIC | Facility: CLINIC | Age: 44
End: 2019-07-09

## 2019-07-09 NOTE — PROGRESS NOTES
Left voice message for patient to call and schedule an appointment with Dr. Valerie Somers for Diabetes

## 2019-07-12 ENCOUNTER — OFFICE VISIT (OUTPATIENT)
Dept: INTERNAL MEDICINE | Age: 44
End: 2019-07-12

## 2019-07-12 VITALS
WEIGHT: 165 LBS | SYSTOLIC BLOOD PRESSURE: 110 MMHG | HEIGHT: 69 IN | TEMPERATURE: 95.1 F | BODY MASS INDEX: 24.44 KG/M2 | RESPIRATION RATE: 20 BRPM | DIASTOLIC BLOOD PRESSURE: 78 MMHG | HEART RATE: 79 BPM

## 2019-07-12 DIAGNOSIS — R80.9 MICROALBUMINURIA: ICD-10-CM

## 2019-07-12 DIAGNOSIS — E78.01 FAMILIAL HYPERCHOLESTEROLEMIA: ICD-10-CM

## 2019-07-12 DIAGNOSIS — Z23 NEED FOR VACCINATION: ICD-10-CM

## 2019-07-12 PROCEDURE — 3078F DIAST BP <80 MM HG: CPT | Performed by: INTERNAL MEDICINE

## 2019-07-12 PROCEDURE — 3074F SYST BP LT 130 MM HG: CPT | Performed by: INTERNAL MEDICINE

## 2019-07-12 PROCEDURE — 99204 OFFICE O/P NEW MOD 45 MIN: CPT | Performed by: INTERNAL MEDICINE

## 2019-07-12 PROCEDURE — 90715 TDAP VACCINE 7 YRS/> IM: CPT

## 2019-07-12 PROCEDURE — 90471 IMMUNIZATION ADMIN: CPT

## 2019-07-12 RX ORDER — ATORVASTATIN CALCIUM 10 MG/1
TABLET, FILM COATED ORAL
COMMUNITY
Start: 2018-04-09

## 2019-07-12 RX ORDER — GLIMEPIRIDE 4 MG/1
4 TABLET ORAL 2 TIMES DAILY
Qty: 60 TABLET | Refills: 0 | Status: SHIPPED | OUTPATIENT
Start: 2019-07-12

## 2019-07-12 RX ORDER — GLIMEPIRIDE 4 MG/1
TABLET ORAL
COMMUNITY
Start: 2018-06-14 | End: 2019-07-12 | Stop reason: SDUPTHER

## 2019-07-12 RX ORDER — METFORMIN HYDROCHLORIDE 750 MG/1
TABLET, EXTENDED RELEASE ORAL
COMMUNITY
Start: 2018-05-10 | End: 2019-07-12 | Stop reason: DRUGHIGH

## 2019-07-12 RX ORDER — LISINOPRIL 2.5 MG/1
TABLET ORAL
COMMUNITY
Start: 2017-12-14

## 2019-07-12 SDOH — HEALTH STABILITY: PHYSICAL HEALTH: ON AVERAGE, HOW MANY MINUTES DO YOU ENGAGE IN EXERCISE AT THIS LEVEL?: 60 MIN

## 2019-07-12 SDOH — HEALTH STABILITY: PHYSICAL HEALTH: ON AVERAGE, HOW MANY DAYS PER WEEK DO YOU ENGAGE IN MODERATE TO STRENUOUS EXERCISE (LIKE A BRISK WALK)?: 7 DAYS

## 2019-07-12 SDOH — HEALTH STABILITY: MENTAL HEALTH
STRESS IS WHEN SOMEONE FEELS TENSE, NERVOUS, ANXIOUS, OR CAN'T SLEEP AT NIGHT BECAUSE THEIR MIND IS TROUBLED. HOW STRESSED ARE YOU?: TO SOME EXTENT

## 2019-07-12 SDOH — HEALTH STABILITY: MENTAL HEALTH: HOW OFTEN DO YOU HAVE A DRINK CONTAINING ALCOHOL?: NEVER

## 2019-07-12 ASSESSMENT — PATIENT HEALTH QUESTIONNAIRE - PHQ9
2. FEELING DOWN, DEPRESSED OR HOPELESS: NOT AT ALL
1. LITTLE INTEREST OR PLEASURE IN DOING THINGS: NOT AT ALL
SUM OF ALL RESPONSES TO PHQ9 QUESTIONS 1 AND 2: 0
SUM OF ALL RESPONSES TO PHQ9 QUESTIONS 1 AND 2: 0

## 2019-10-23 ENCOUNTER — TELEPHONE (OUTPATIENT)
Dept: FAMILY MEDICINE CLINIC | Facility: CLINIC | Age: 44
End: 2019-10-23

## 2019-10-23 NOTE — TELEPHONE ENCOUNTER
I spoke to patient and he stated that Dr. Dayanara Hogan is no longer his Primary Care Provider. Client did not state who is his new PCP as he is in a meeting.

## 2021-08-05 ENCOUNTER — TELEPHONE (OUTPATIENT)
Dept: FAMILY MEDICINE CLINIC | Facility: CLINIC | Age: 46
End: 2021-08-05

## 2021-08-05 NOTE — TELEPHONE ENCOUNTER
Multiple outreaches. Patients insurance is no longer listed w/ Dr. Shan Garrett.  Removal form filled out

## 2024-09-18 ENCOUNTER — TELEPHONE (OUTPATIENT)
Dept: FAMILY MEDICINE CLINIC | Facility: CLINIC | Age: 49
End: 2024-09-18

## 2024-09-20 ENCOUNTER — PATIENT OUTREACH (OUTPATIENT)
Dept: CASE MANAGEMENT | Age: 49
End: 2024-09-20

## 2024-09-20 NOTE — PROCEDURES
The office order for PCP removal request is Denied and finalized on September 20, 2024.      [facility] patient [facility]:  [facility] Attributed PCP is Marc Ramos MD      Office, must contact patient to verify PCP. Office must also Inform patient they must contact [facility] to provide the name of current PCP.     [facility] Members have 3 options to contact [facility] to change their PCP:  [facility] member portal website [email or web address]   Email [employer] customer service [email or web address]  Call [employer] customer service at [phone]    [facility] PCP assignment is handled by [facility], not [facility].     Office should also mail patient a letter to contact office to schedule an appointment or contact [facility] to update their PCP.    After office has verified patient is no longer listed on the [facility] monthly eligibility list and not assigned to Dr. Ramos then office must resubmit a new order for PCP removal request.

## 2024-12-04 PROBLEM — E78.01 FAMILIAL HYPERCHOLESTEROLEMIA: Status: ACTIVE | Noted: 2019-07-12

## 2024-12-04 PROBLEM — R80.9 MICROALBUMINURIA: Status: ACTIVE | Noted: 2024-12-04

## 2025-02-04 ENCOUNTER — TELEPHONE (OUTPATIENT)
Dept: FAMILY MEDICINE CLINIC | Facility: CLINIC | Age: 50
End: 2025-02-04

## 2025-05-02 ENCOUNTER — TELEPHONE (OUTPATIENT)
Dept: FAMILY MEDICINE CLINIC | Facility: CLINIC | Age: 50
End: 2025-05-02

## 2025-05-03 ENCOUNTER — PATIENT OUTREACH (OUTPATIENT)
Dept: CASE MANAGEMENT | Age: 50
End: 2025-05-03

## 2025-05-03 NOTE — PROCEDURES
The office order for PCP removal request is Approved and finalized on May 3, 2025.    Removed Marc Ramos MD as the patient's Primary Care Physician

## (undated) NOTE — MR AVS SNAPSHOT
[address] [facility] [employer]  [address] [facility],[address] [place], [address] [phone]               Thank you for choosing us for your health care visit with Emmanuel Singletary MD.  We are glad to serve you and happy to p Commonly known as:  ONETOUCH TEST           * Glucose Blood Strp   Check sugars twice a day   What changed: You were already taking a medication with the same name, and this prescription was added. Make sure you understand how and when to take each.    Com - lisinopril 2.5 MG Tabs            Today's Orders     CBC, Platelet;  No Differential    Complete by:  Jan 05, 2017 (Approximate)        Comp Metabolic Panel    Complete by:  Jan 05, 2017 (Approximate)        Hemoglobin A1C    Complete by:  Jan 05, 2017 (A Type II or unspecified type diabetes mellitus without mention of complication, uncontrolled [facility] [2440]           Type II or unspecified type diabetes mellitus without mention of complication, uncontrolled ([facility] [2440]           Type II or unspeci

## (undated) NOTE — MR AVS SNAPSHOT
[facility] [employer]  [address] [facility],[address] [place], [address] [phone]               Thank you for choosing us for your health care visit with Casey Marroquin MD.  We are glad to serve you and happy to p Commonly known as:  PRINIVIL,ZESTRIL           MetFORMIN HCl  MG Tb24   Take 1 tablet (750 mg total) by mouth daily with breakfast.   Commonly known as:  GLUCOPHAGE-XR           * ONETOUCH DELICA LANCETS FINE Misc   1 lancet by Finger stick route chica not sign up before the expiration date, you must request a new code. Your unique [employer] Access Code: [email or web address]  Expires: 3/6/2017  9:13 PM    If you have questions, you can call [phone] to talk to our [facility] Staff.  Remember, Ziyad i

## (undated) NOTE — MR AVS SNAPSHOT
[facility] [employer]  [address] [facility],[address] [place], [address] [phone]               Thank you for choosing us for your health care visit with Melo Bergeron MD.  We are glad to serve you and happy to p Commonly known as:  GLUCOPHAGE-XR           * ONETOUCH DELICA LANCETS FINE Misc   1 lancet by Finger stick route daily. * ACCU-CHEK FASTCLIX LANCETS Misc   1 lancet by Finger stick route 2 (two) times daily. Use as directed.            * ONETOUCH Your physician has referred you to a specialist.  Your physician or the clinic staff will provide you with the phone number you should call to schedule your appointment.      If you are confident that your benefit plan will not require a referral or authori

## (undated) NOTE — MR AVS SNAPSHOT
[employer] [facility] [employer]  [address] [facility],[address] [place], [address] [phone]               Thank you for choosing us for your health care visit with Deirdre Solares MD.  We are glad to serve you and happy to p 1 lancet by Finger stick route daily. * ACCU-CHEK FASTCLIX LANCETS Misc   1 lancet by Finger stick route 2 (two) times daily. Use as directed. * ONETOUCH ULTRA 2 w/Device Kit   1 Device by Other route 2 (two) times daily.  Use as directe reflect the long term average glucose and may not correlate with random or fasting glucose levels since these represent specific points in time. Mah[employer]     Sign up for [employer], your secure online medical record.   [employer] will allow